# Patient Record
Sex: MALE | Race: WHITE | Employment: FULL TIME | URBAN - METROPOLITAN AREA
[De-identification: names, ages, dates, MRNs, and addresses within clinical notes are randomized per-mention and may not be internally consistent; named-entity substitution may affect disease eponyms.]

---

## 2021-06-03 ENCOUNTER — TELEPHONE (OUTPATIENT)
Dept: OTHER | Age: 51
End: 2021-06-03

## 2021-06-03 NOTE — TELEPHONE ENCOUNTER
Pt name and  verified. Who is your current or most recent primary care provider and   which hospital were they affiliated with? It's been a couple years, Wanda Gutierrez, he believes. [facility]    How long did you see them for? Couple years    Is there a reason that you are looking for a new provider? To have PCP - Establish Care / Just relocated    Would you like to provide your e-mail to sign up for out My Chart system? Already Set Up  The benefits of being a My Chart member are:    -Request medical appointments  -View your health summary from the My Chart electronic health record. -View test results.  -Request prescription renewals.  -Access trusted health information resources.  -Communicate electronically and securely with your medical care team.    Would you like a test message or letter with your activation code? n/a    Do you have a preference about the provider you will see? Requesting [facility]    When was your last Annual or Well Child Exam? No, 3-4 years    Do you have any ongoing or chronic conditions you are currently being treated   for (e.g. Hypertension, Diabetes, Depression, etc)? If yes list: Hypertension, Metrovalve Prolapse? Pt unsure / Microvalve regurgitation    Have you been seen by a provider for these chronic conditions in the last 6 months? If so, who? Dr. Whit Schulte    Do you need an appointment to establish care or is there something more   urgent you need to be seen for?  Explain: Establish Care - Needs PCP    Are you on any medicines that require a special prescription such as a sleeping pills, pain medication or stimulants? (if no go ahead and book appointment, if yes please read the following: no    \"Please be aware that our provider's may not prescribe these medications at the first visit as they need to get to know more about you and your medical problems/history before they will safely prescribe these\" Inforemd? Yes or No n/a    Do you have any questions about what we discussed? no  (If no go ahead and book, if yes please get the details and send to the practice)    Your new patient appointment is booked on 8/12 at 12:30 with PANCHO Sim DO.    **Thank you for choosing us for your health care needs. Mr. Mary Lou Arnold I do want to let you know that you will need to contact your insurance company and make them aware of your new Primary Care Provider which is Estee Basilio DO. Also Mr. Mary Lou Arnold a new patient packet will be coming to you. Would you like to have this mailed or faxed to you? mailed If faxed what is the number? n/a  We would greatly appreciate it if you could complete the release of records and either mail back or drop off at out office within 7 days of receipt. Notes: You will be required to wear a mask when entering the building. Have you had close contact with someone that has tested positive for coronavirus called COVID-19? No  Do you have a fever, cough or shortness of breath?  No  Are you pending a COVID test?No    #: [phone] ([place])     Billee Postal

## 2021-08-12 PROBLEM — G47.31 PRIMARY CENTRAL SLEEP APNEA: Status: ACTIVE | Noted: 2021-08-12

## 2021-08-12 PROBLEM — I34.0 NONRHEUMATIC MITRAL VALVE REGURGITATION: Status: ACTIVE | Noted: 2021-08-12

## 2021-08-12 PROBLEM — E66.9 OBESITY WITH SERIOUS COMORBIDITY: Status: ACTIVE | Noted: 2021-08-12

## 2021-08-12 PROBLEM — I10 ESSENTIAL HYPERTENSION: Status: ACTIVE | Noted: 2021-08-12

## 2021-08-12 PROBLEM — Z00.00 PREVENTATIVE HEALTH CARE: Status: ACTIVE | Noted: 2021-08-12

## 2021-11-29 PROBLEM — Z12.11 ENCOUNTER FOR SCREENING COLONOSCOPY: Status: ACTIVE | Noted: 2021-08-12

## 2022-05-09 PROBLEM — E78.5 HYPERLIPIDEMIA: Status: ACTIVE | Noted: 2022-05-09

## 2022-05-09 PROBLEM — I34.1 NONRHEUMATIC MITRAL (VALVE) PROLAPSE: Status: ACTIVE | Noted: 2022-05-09
